# Patient Record
Sex: MALE | URBAN - NONMETROPOLITAN AREA
[De-identification: names, ages, dates, MRNs, and addresses within clinical notes are randomized per-mention and may not be internally consistent; named-entity substitution may affect disease eponyms.]

---

## 2024-01-03 LAB
BASOPHILS ABSOLUTE: ABNORMAL
BASOPHILS RELATIVE PERCENT: ABNORMAL
EOSINOPHILS ABSOLUTE: ABNORMAL
EOSINOPHILS RELATIVE PERCENT: ABNORMAL
HCT VFR BLD CALC: 40.2 % (ref 41–53)
HEMOGLOBIN: 13.7 G/DL (ref 13.5–17.5)
LYMPHOCYTES ABSOLUTE: ABNORMAL
LYMPHOCYTES RELATIVE PERCENT: ABNORMAL
MCH RBC QN AUTO: ABNORMAL PG
MCHC RBC AUTO-ENTMCNC: ABNORMAL G/DL
MCV RBC AUTO: ABNORMAL FL
MONOCYTES ABSOLUTE: ABNORMAL
MONOCYTES RELATIVE PERCENT: ABNORMAL
NEUTROPHILS ABSOLUTE: ABNORMAL
NEUTROPHILS RELATIVE PERCENT: ABNORMAL
PLATELET # BLD: 269 K/ΜL
PMV BLD AUTO: ABNORMAL FL
RBC # BLD: 4.39 10^6/ΜL
WBC # BLD: 5.64 10^3/ML

## 2024-01-04 PROBLEM — I61.2 NONTRAUMATIC HEMORRHAGE OF RIGHT CEREBRAL HEMISPHERE (HCC): Status: ACTIVE | Noted: 2023-12-21

## 2024-01-04 PROBLEM — G81.94 LEFT HEMIPARESIS (HCC): Status: ACTIVE | Noted: 2023-12-21

## 2024-01-04 PROBLEM — I10 PRIMARY HYPERTENSION: Status: ACTIVE | Noted: 2023-12-21

## 2024-01-04 PROBLEM — I61.9 HEMORRHAGIC STROKE (HCC): Chronic | Status: ACTIVE | Noted: 2023-12-15

## 2024-01-04 PROBLEM — N18.30 CHRONIC RENAL DISEASE, STAGE III (HCC): Status: ACTIVE | Noted: 2023-12-21

## 2024-01-04 RX ORDER — CARVEDILOL 25 MG/1
25 TABLET ORAL 2 TIMES DAILY
COMMUNITY
Start: 2023-12-21

## 2024-01-04 RX ORDER — TORSEMIDE 20 MG/1
20 TABLET ORAL DAILY
COMMUNITY
Start: 2023-12-22

## 2024-01-04 RX ORDER — ATORVASTATIN CALCIUM 10 MG/1
10 TABLET, FILM COATED ORAL DAILY
COMMUNITY

## 2024-01-04 RX ORDER — HYDRALAZINE HYDROCHLORIDE 50 MG/1
100 TABLET, FILM COATED ORAL EVERY 8 HOURS
COMMUNITY
Start: 2023-12-29

## 2024-01-04 RX ORDER — CLONIDINE HYDROCHLORIDE 0.1 MG/1
0.1 TABLET ORAL 2 TIMES DAILY
COMMUNITY
Start: 2023-12-29

## 2024-01-04 RX ORDER — AMLODIPINE BESYLATE 10 MG/1
10 TABLET ORAL NIGHTLY
COMMUNITY
Start: 2023-12-21

## 2024-01-04 RX ORDER — POTASSIUM CHLORIDE 20 MEQ/1
20 TABLET, EXTENDED RELEASE ORAL DAILY
COMMUNITY
Start: 2023-12-21

## 2024-01-26 LAB
BASOPHILS ABSOLUTE: ABNORMAL
BASOPHILS RELATIVE PERCENT: ABNORMAL
EOSINOPHILS ABSOLUTE: ABNORMAL
EOSINOPHILS RELATIVE PERCENT: ABNORMAL
HCT VFR BLD CALC: 40.2 % (ref 41–53)
HEMOGLOBIN: 14 G/DL (ref 13.5–17.5)
LYMPHOCYTES ABSOLUTE: ABNORMAL
LYMPHOCYTES RELATIVE PERCENT: ABNORMAL
MCH RBC QN AUTO: ABNORMAL PG
MCHC RBC AUTO-ENTMCNC: ABNORMAL G/DL
MCV RBC AUTO: ABNORMAL FL
MONOCYTES ABSOLUTE: ABNORMAL
MONOCYTES RELATIVE PERCENT: ABNORMAL
NEUTROPHILS ABSOLUTE: ABNORMAL
NEUTROPHILS RELATIVE PERCENT: ABNORMAL
PLATELET # BLD: 261 K/ΜL
PMV BLD AUTO: ABNORMAL FL
RBC # BLD: 4.54 10^6/ΜL
SEDIMENTATION RATE, ERYTHROCYTE: 30
WBC # BLD: 7.63 10^3/ML

## 2024-02-16 ENCOUNTER — TELEPHONE (OUTPATIENT)
Dept: NEPHROLOGY | Age: 54
End: 2024-02-16

## 2024-02-16 NOTE — TELEPHONE ENCOUNTER
2/16-spoke with patient to confirm is appt for 2/23- He \"thinks he can keep it\" He will check his calendar at home. I asked him to call if he can't keep it. He wanted us to text him location info but I explained I couldn't. I  Printed a reminder and placed in mail but not sure it will make it there. You may want to check back with him next week!

## 2024-02-20 NOTE — TELEPHONE ENCOUNTER
I spoke to patient he cannot come in this Friday. He wants to wait because he goes back to work this Friday and he works 4 pm to midnight. He said he don't know why he is scheduled anyway. I was going to tell him but he was in a hurry to get off the phone. Rescheduled to the end of May.

## 2024-03-05 LAB
ALBUMIN: 4.1
BUN BLDV-MCNC: 18 MG/DL
CALCIUM SERPL-MCNC: 9.4 MG/DL
CHLORIDE BLD-SCNC: 107 MMOL/L
CO2: 26 MMOL/L
CREAT SERPL-MCNC: 1.8 MG/DL
EGFR: 42
GLUCOSE BLD-MCNC: 97 MG/DL
POTASSIUM SERPL-SCNC: 4 MMOL/L
SODIUM BLD-SCNC: 138 MMOL/L

## 2024-04-09 LAB
ALBUMIN: 4.5
BUN BLDV-MCNC: 24 MG/DL
CALCIUM SERPL-MCNC: 9.2 MG/DL
CHLORIDE BLD-SCNC: 107 MMOL/L
CO2: 23 MMOL/L
CREAT SERPL-MCNC: 2.1 MG/DL
EGFR: 35
GLUCOSE BLD-MCNC: 93 MG/DL
POTASSIUM SERPL-SCNC: 4.2 MMOL/L
SODIUM BLD-SCNC: 138 MMOL/L

## 2024-06-10 LAB
ALBUMIN: 4.6
BUN BLDV-MCNC: 17 MG/DL
CALCIUM SERPL-MCNC: 9.4 MG/DL
CHLORIDE BLD-SCNC: 111 MMOL/L
CO2: 21 MMOL/L
CREAT SERPL-MCNC: 1.9 MG/DL
EGFR: 39
GLUCOSE BLD-MCNC: 95 MG/DL
PHOSPHORUS: 3.1 MG/DL
POTASSIUM SERPL-SCNC: 3.8 MMOL/L
SODIUM BLD-SCNC: 139 MMOL/L

## 2024-06-27 RX ORDER — BLOOD PRESSURE TEST KIT
KIT MISCELLANEOUS
COMMUNITY
Start: 2024-01-31

## 2024-08-30 ENCOUNTER — OFFICE VISIT (OUTPATIENT)
Dept: NEPHROLOGY | Age: 54
End: 2024-08-30
Payer: COMMERCIAL

## 2024-08-30 VITALS
OXYGEN SATURATION: 95 % | HEART RATE: 82 BPM | SYSTOLIC BLOOD PRESSURE: 142 MMHG | DIASTOLIC BLOOD PRESSURE: 100 MMHG | WEIGHT: 228 LBS

## 2024-08-30 DIAGNOSIS — I10 PRIMARY HYPERTENSION: ICD-10-CM

## 2024-08-30 DIAGNOSIS — E78.5 DYSLIPIDEMIA: ICD-10-CM

## 2024-08-30 DIAGNOSIS — N18.32 STAGE 3B CHRONIC KIDNEY DISEASE (HCC): Primary | ICD-10-CM

## 2024-08-30 PROCEDURE — 99204 OFFICE O/P NEW MOD 45 MIN: CPT | Performed by: INTERNAL MEDICINE

## 2024-08-30 PROCEDURE — 3077F SYST BP >= 140 MM HG: CPT | Performed by: INTERNAL MEDICINE

## 2024-08-30 PROCEDURE — 3080F DIAST BP >= 90 MM HG: CPT | Performed by: INTERNAL MEDICINE

## 2024-08-30 NOTE — PROGRESS NOTES
Patient expresses significant concern regarding his health and kidneys. He informs me that he had been taking a lot of ibuprofen for a few months. He states \"a bottle a day, approximately 80 tabs a day\"  He stopped all of his prescribed medications for blood pressure last March 2023. His family Doctor told him he would have a stroke if he did that. Nic reports he had a stroke in December 2023. He was life flighted to [facility] for 1 week and he went to [place] for a week.   He was on Torsemide and stopped it. He started having leg swelling so he started it again. His swelling has resolved so he stopped it again.   Patient reports he was very frustrated and \"stopped all of his medications for a week or so because he was so tired.\" He has been back on them for approximately 6 weeks now.   He reports having a full cardiac work up [facility].

## 2024-08-30 NOTE — PROGRESS NOTES
Nephrology Consult Note  Patient's Name: Nic Marquez  2:38 PM  8/30/2024    Nephrologist: Jesse    Reason for Consult: Elevated serum creatinine level  Requesting Physician:  No att. providers found  PCP: Venkatesh Christensen MD    Chief Complaint:  None  Assessment    ICD-10-CM    1. Stage 3b chronic kidney disease (HCC)  N18.32 Basic Metabolic Panel     Kappa/Lambda Quantitative Free Light Chains, Serum     Vitamin D 25 Hydroxy     PTH, Intact     Protein / creatinine ratio, urine     CANCELED: US RENAL COMPLETE      2. Primary hypertension  I10       3. Dyslipidemia  E78.5             Plan    Chronic kidney disease is likely multifactorial resulting from combination of hypertensive nephrosclerosis as well as  loop diuretic torsemide.  However, possibility of obstructive uropathy is also in the differential diagnosis.  I discussed that with the patient.  Kidney ultrasound report from [facility] done in December 2023 report reviewed.  No hydronephrosis.  Check markers of chronicity.  Will also check serologies for the sake of completeness.  [employer] with Lambda Free Light Chain Assay Ratio.  Urine protein creatinine ratio  Avoid any nephrotoxic drugs including nonsteroidal anti-inflammatory drugs  Comprehensive list provided to the patient.  Monitor blood pressure at home  Regarding the bilateral lower extremity edema, he is on multiple medications that can account for that including amlodipine, hydralazine and clonidine respectively.  Suggest discontinuation of hydralazine and replacement with alternate agent.  This will be at the discretion of the primary care provider however.    He had echocardiogram done in the hospital according to him but I cannot see the result.  He was told it was normal.  Return visit in 4 weeks with labs      History Obtained From: Patient and electronic medical record   History of Present Ilness:    Nic Marquez is a 54 y.o. male with history of hypertension and vitamin D deficiency

## 2024-10-28 LAB
BASOPHILS ABSOLUTE: 0.08 /ΜL
BASOPHILS RELATIVE PERCENT: 1.1 %
BUN BLDV-MCNC: 24 MG/DL
CALCIUM SERPL-MCNC: 9.3 MG/DL
CHLORIDE BLD-SCNC: 105 MMOL/L
CO2: 25 MMOL/L
CREAT SERPL-MCNC: 2.1 MG/DL
EGFR: 35
EOSINOPHILS ABSOLUTE: 0.24 /ΜL
EOSINOPHILS RELATIVE PERCENT: 3.3 %
GLUCOSE BLD-MCNC: 125 MG/DL
HCT VFR BLD CALC: 41.8 % (ref 41–53)
HEMOGLOBIN: 15.1 G/DL (ref 13.5–17.5)
LYMPHOCYTES ABSOLUTE: 1.75 /ΜL
LYMPHOCYTES RELATIVE PERCENT: 23.8 %
MCH RBC QN AUTO: 30.9 PG
MCHC RBC AUTO-ENTMCNC: 36.1 G/DL
MCV RBC AUTO: 85.7 FL
MONOCYTES ABSOLUTE: 0.78 /ΜL
MONOCYTES RELATIVE PERCENT: 10.6 %
NEUTROPHILS ABSOLUTE: 4.43 /ΜL
NEUTROPHILS RELATIVE PERCENT: 60.1 %
PLATELET # BLD: 240 K/ΜL
PMV BLD AUTO: 10.5 FL
POTASSIUM SERPL-SCNC: 3.8 MMOL/L
RBC # BLD: 4.88 10^6/ΜL
SODIUM BLD-SCNC: 142 MMOL/L
WBC # BLD: 7.36 10^3/ML

## 2024-10-31 DIAGNOSIS — E55.9 VITAMIN D DEFICIENCY: ICD-10-CM

## 2024-10-31 DIAGNOSIS — N18.32 STAGE 3B CHRONIC KIDNEY DISEASE (HCC): Primary | ICD-10-CM

## 2024-11-04 LAB
PTH INTACT: 192.8
VITAMIN D 25-HYDROXY: 39.3
VITAMIN D2, 25 HYDROXY: NORMAL
VITAMIN D3,25 HYDROXY: NORMAL

## 2024-11-05 LAB
KAPPA FREE LIGHT CHAINS QNT: 46.8 MG/L (ref 3.3–19.4)
KAPPA/LAMBDA RATIO: 1.29 RATIO (ref 0.26–1.65)
LAMBDA FREE LIGHT CHAINS QNT: 36.4 MG/L (ref 5.71–26.3)

## 2024-11-08 ENCOUNTER — OFFICE VISIT (OUTPATIENT)
Dept: NEPHROLOGY | Age: 54
End: 2024-11-08
Payer: COMMERCIAL

## 2024-11-08 VITALS
WEIGHT: 235 LBS | HEIGHT: 70 IN | SYSTOLIC BLOOD PRESSURE: 154 MMHG | BODY MASS INDEX: 33.64 KG/M2 | HEART RATE: 71 BPM | DIASTOLIC BLOOD PRESSURE: 98 MMHG | OXYGEN SATURATION: 96 %

## 2024-11-08 DIAGNOSIS — R80.9 PROTEINURIA, UNSPECIFIED TYPE: ICD-10-CM

## 2024-11-08 DIAGNOSIS — N18.32 STAGE 3B CHRONIC KIDNEY DISEASE (HCC): Primary | ICD-10-CM

## 2024-11-08 DIAGNOSIS — N25.81 HYPERPARATHYROIDISM, SECONDARY RENAL (HCC): ICD-10-CM

## 2024-11-08 DIAGNOSIS — I10 PRIMARY HYPERTENSION: ICD-10-CM

## 2024-11-08 DIAGNOSIS — D47.2 MONOCLONAL GAMMOPATHY: ICD-10-CM

## 2024-11-08 PROCEDURE — 3077F SYST BP >= 140 MM HG: CPT | Performed by: INTERNAL MEDICINE

## 2024-11-08 PROCEDURE — 3080F DIAST BP >= 90 MM HG: CPT | Performed by: INTERNAL MEDICINE

## 2024-11-08 PROCEDURE — 99214 OFFICE O/P EST MOD 30 MIN: CPT | Performed by: INTERNAL MEDICINE

## 2024-11-08 RX ORDER — CALCITRIOL 0.25 UG/1
0.25 CAPSULE, LIQUID FILLED ORAL DAILY
Qty: 30 CAPSULE | Refills: 3 | Status: SHIPPED | OUTPATIENT
Start: 2024-11-08

## 2024-11-08 NOTE — PROGRESS NOTES
Renal Progress Note    Assessment and Plan:      Diagnosis Orders   1. Stage 3b chronic kidney disease (HCC)        2. Primary hypertension        3. Hyperparathyroidism, secondary renal (HCC)        4. Monoclonal gammopathy        5. Proteinuria, unspecified type                  PLAN:  Serum creatinine slightly increased to 2.1 mg/L from 1.9 mg/dL in June 2024 but is still within his baseline as it was also 2.10 mg/dl April 2024.  Blood pressure is high today in the office but managed by a different provider.  I did ask him to monitor his blood pressure at home however very closely  2-3 times a day.  Parathyroid hormone level is high at 192.8.  Will start him on calcitriol 0.25 mcg once a day.  Kappa with lambda free light chain assay is high but the ratio is normal.  Will repeat again with his next labs.  Urine protein creatinine ratio is still pending.  Medications reviewed  No changes  Return visit in 3 months with labs          Patient Active Problem List   Diagnosis    Chronic renal disease, stage III (HCC)    Hemorrhagic stroke (HCC)    Left hemiparesis (HCC)    Nontraumatic hemorrhage of right cerebral hemisphere (HCC)    Primary hypertension           Subjective:   Chief complaint:  Chief Complaint   Patient presents with    Follow-up     FU 3 months for CKD 3b      HPI:This is a follow up visit for Mr. Nic Marquez here today for return appointment.  I saw him initial appointment in August 2024.  Doing well with no complaint today.  Appetite is good.  No chest pain.  No shortness of breath.  No difficulties urination    ROS:  Pertinent positives stated above in HPI. All other systems were reviewed and were negative.  Medications:     Current Outpatient Medications   Medication Sig Dispense Refill    Blood Pressure KIT       Multiple Vitamin (MULTIVITAMINS PO) Multivitamin (Multiple Vitamins) tablet Active 1 TAB PO DAILY January 31st, 2024 12:00am      amLODIPine (NORVASC) 10 MG tablet Take 1 tablet by

## 2025-02-12 LAB
BASOPHILS ABSOLUTE: NORMAL
BASOPHILS RELATIVE PERCENT: NORMAL
EOSINOPHILS ABSOLUTE: NORMAL
EOSINOPHILS RELATIVE PERCENT: NORMAL
ESTIMATED AVERAGE GLUCOSE: 108
HBA1C MFR BLD: 5.4 %
HCT VFR BLD CALC: 46.9 % (ref 41–53)
HEMOGLOBIN: 16.7 G/DL (ref 13.5–17.5)
LYMPHOCYTES ABSOLUTE: NORMAL
LYMPHOCYTES RELATIVE PERCENT: NORMAL
MCH RBC QN AUTO: 30.9 PG
MCHC RBC AUTO-ENTMCNC: 35.6 G/DL
MCV RBC AUTO: 86.9 FL
MONOCYTES ABSOLUTE: NORMAL
MONOCYTES RELATIVE PERCENT: NORMAL
NEUTROPHILS ABSOLUTE: NORMAL
NEUTROPHILS RELATIVE PERCENT: NORMAL
PLATELET # BLD: 223 K/ΜL
PMV BLD AUTO: 11 FL
RBC # BLD: 5.4 10^6/ΜL
WBC # BLD: 8.12 10^3/ML

## 2025-02-13 LAB
ALBUMIN: 4.9 G/DL
ALP BLD-CCNC: 81 U/L
ALT SERPL-CCNC: 33 U/L
ANION GAP SERPL CALCULATED.3IONS-SCNC: 18 MMOL/L
AST SERPL-CCNC: 40 U/L
BILIRUB SERPL-MCNC: 0.7 MG/DL (ref 0.1–1.4)
BUN BLDV-MCNC: 23 MG/DL
CALCIUM SERPL-MCNC: 9.6 MG/DL
CHLORIDE BLD-SCNC: 103 MMOL/L
CO2: 24 MMOL/L
CREAT SERPL-MCNC: 2 MG/DL
GFR, ESTIMATED: 37
GLUCOSE BLD-MCNC: 105 MG/DL
POTASSIUM SERPL-SCNC: 4.3 MMOL/L
SODIUM BLD-SCNC: 141 MMOL/L
TOTAL PROTEIN: 8.9 G/DL (ref 6.4–8.2)

## 2025-02-20 DIAGNOSIS — I10 PRIMARY HYPERTENSION: ICD-10-CM

## 2025-02-20 DIAGNOSIS — N18.32 STAGE 3B CHRONIC KIDNEY DISEASE (HCC): Primary | ICD-10-CM

## 2025-02-20 DIAGNOSIS — N25.81 HYPERPARATHYROIDISM, SECONDARY RENAL: ICD-10-CM

## 2025-02-27 ENCOUNTER — TELEPHONE (OUTPATIENT)
Dept: NEPHROLOGY | Age: 55
End: 2025-02-27

## 2025-02-27 NOTE — TELEPHONE ENCOUNTER
Spoke with Nic regarding 3 month follow up appointment scheduled tomorrow. He lost his job. Has no insurance. I told him I called  today and he has a CMP from 2/12/25. We can still see him tomorrow. He wants to reschedule. I suggested applying for a medical card for insurance coverage until he can secure new employment. I will route CMP to Dr. Molina for review when we receive it from Carlos. Rescheduled him on 3/28/25.

## 2025-07-02 LAB
KAPPA FREE LIGHT CHAINS QNT: 46 MG/L (ref 3.3–19.4)
KAPPA/LAMBDA RATIO: 1.12 RATIO (ref 0.26–1.65)
LAMBDA FREE LIGHT CHAINS QNT: 41.1 MG/L (ref 5.71–26.3)

## 2025-07-11 ENCOUNTER — OFFICE VISIT (OUTPATIENT)
Dept: NEPHROLOGY | Age: 55
End: 2025-07-11
Payer: COMMERCIAL

## 2025-07-11 VITALS
BODY MASS INDEX: 34.5 KG/M2 | OXYGEN SATURATION: 96 % | HEIGHT: 70 IN | SYSTOLIC BLOOD PRESSURE: 162 MMHG | DIASTOLIC BLOOD PRESSURE: 105 MMHG | WEIGHT: 241 LBS | HEART RATE: 72 BPM

## 2025-07-11 DIAGNOSIS — I10 PRIMARY HYPERTENSION: ICD-10-CM

## 2025-07-11 DIAGNOSIS — N25.81 HYPERPARATHYROIDISM, SECONDARY RENAL: ICD-10-CM

## 2025-07-11 DIAGNOSIS — E55.9 VITAMIN D DEFICIENCY: ICD-10-CM

## 2025-07-11 DIAGNOSIS — N18.32 STAGE 3B CHRONIC KIDNEY DISEASE (HCC): Primary | ICD-10-CM

## 2025-07-11 DIAGNOSIS — R80.9 MICROALBUMINURIA: ICD-10-CM

## 2025-07-11 PROCEDURE — 3077F SYST BP >= 140 MM HG: CPT | Performed by: INTERNAL MEDICINE

## 2025-07-11 PROCEDURE — 99213 OFFICE O/P EST LOW 20 MIN: CPT | Performed by: INTERNAL MEDICINE

## 2025-07-11 PROCEDURE — 3080F DIAST BP >= 90 MM HG: CPT | Performed by: INTERNAL MEDICINE

## 2025-07-11 RX ORDER — ROSUVASTATIN CALCIUM 10 MG/1
10 TABLET, COATED ORAL DAILY
COMMUNITY
Start: 2025-07-09

## 2025-07-11 RX ORDER — BUPROPION HYDROCHLORIDE 150 MG/1
150 TABLET ORAL EVERY MORNING
COMMUNITY
Start: 2025-07-09

## 2025-07-11 NOTE — PROGRESS NOTES
Patient had lost his  job and insurance after his last stroke. He now has insurance. He reports he has gained a lot of weight and had to go to ED in February for HBP due to not having his medications.   Patient is unsure of his medications. He just saw  Wednesday and some changes were made. He will call back with medications.

## 2025-07-11 NOTE — PROGRESS NOTES
Renal Progress Note    Assessment and Plan:      Diagnosis Orders   1. Stage 3b chronic kidney disease (HCC)  Basic Metabolic Panel      2. Primary hypertension        3. Hyperparathyroidism, secondary renal  PTH, Intact      4. Vitamin D deficiency  Vitamin D 25 Hydroxy      5. Microalbuminuria                  PLAN:  BMP is pending and Kappa lambda ratio is normal  Blood pressure is high and advised to monitor it at home   PTH is pending  Vit D level is pending   Microalbumin creatinine ratio is pending   Medications reviewed  No changes pending lab results          Patient Active Problem List   Diagnosis    Chronic renal disease, stage III (HCC)    Hemorrhagic stroke (HCC)    Left hemiparesis (HCC)    Nontraumatic hemorrhage of right cerebral hemisphere (HCC)    Primary hypertension           Subjective:   Chief complaint:  Chief Complaint   Patient presents with    Follow-up     6 month FU for CKD 3b      HPI:This is a follow up visit for Mr Nic Marquez here today for a follow up appointment .Sees him for CKD among other things .Was last seen in November 2024 and doing well with no complaint.Has good appetite and urinates well    ROS:  Pertinent positives stated above in HPI. All other systems were reviewed and were negative.  Medications:     Current Outpatient Medications   Medication Sig Dispense Refill    Ergocalciferol 50 MCG (2000 UT) CAPS Take 2,000 Units by mouth nightly      rosuvastatin (CRESTOR) 10 MG tablet Take 1 tablet by mouth daily      calcitRIOL (ROCALTROL) 0.25 MCG capsule Take 1 capsule by mouth daily 30 capsule 3    Blood Pressure KIT       Multiple Vitamin (MULTIVITAMINS PO) Multivitamin (Multiple Vitamins) tablet Active 1 TAB PO DAILY January 31st, 2024 12:00am      amLODIPine (NORVASC) 10 MG tablet Take 1 tablet by mouth nightly      carvedilol (COREG) 25 MG tablet Take 1 tablet by mouth daily      vitamin D (CHOLECALCIFEROL) 25 MCG (1000 UT) TABS tablet Take 1 tablet by mouth daily